# Patient Record
Sex: MALE | Race: WHITE | NOT HISPANIC OR LATINO | Employment: OTHER | URBAN - METROPOLITAN AREA
[De-identification: names, ages, dates, MRNs, and addresses within clinical notes are randomized per-mention and may not be internally consistent; named-entity substitution may affect disease eponyms.]

---

## 2023-01-06 PROBLEM — I70.222 CRITICAL LIMB ISCHEMIA OF LEFT LOWER EXTREMITY: Status: ACTIVE | Noted: 2023-01-06

## 2024-01-18 PROBLEM — I73.9 PERIPHERAL VASCULAR DISEASE: Status: ACTIVE | Noted: 2024-01-18

## 2024-02-21 ENCOUNTER — OFFICE VISIT (OUTPATIENT)
Dept: UROLOGY | Facility: CLINIC | Age: 49
End: 2024-02-21
Payer: COMMERCIAL

## 2024-02-21 VITALS
HEART RATE: 91 BPM | SYSTOLIC BLOOD PRESSURE: 112 MMHG | DIASTOLIC BLOOD PRESSURE: 74 MMHG | RESPIRATION RATE: 16 BRPM | WEIGHT: 270.06 LBS | BODY MASS INDEX: 39.88 KG/M2

## 2024-02-21 DIAGNOSIS — N50.811 PAIN IN BOTH TESTICLES: Primary | ICD-10-CM

## 2024-02-21 DIAGNOSIS — N50.812 PAIN IN BOTH TESTICLES: Primary | ICD-10-CM

## 2024-02-21 PROCEDURE — 99204 OFFICE O/P NEW MOD 45 MIN: CPT | Mod: S$GLB,,, | Performed by: UROLOGY

## 2024-02-21 PROCEDURE — 3078F DIAST BP <80 MM HG: CPT | Mod: CPTII,S$GLB,, | Performed by: UROLOGY

## 2024-02-21 PROCEDURE — 99999 PR PBB SHADOW E&M-EST. PATIENT-LVL IV: CPT | Mod: PBBFAC,,, | Performed by: UROLOGY

## 2024-02-21 PROCEDURE — 1160F RVW MEDS BY RX/DR IN RCRD: CPT | Mod: CPTII,S$GLB,, | Performed by: UROLOGY

## 2024-02-21 PROCEDURE — 1159F MED LIST DOCD IN RCRD: CPT | Mod: CPTII,S$GLB,, | Performed by: UROLOGY

## 2024-02-21 PROCEDURE — 3074F SYST BP LT 130 MM HG: CPT | Mod: CPTII,S$GLB,, | Performed by: UROLOGY

## 2024-02-21 PROCEDURE — 3008F BODY MASS INDEX DOCD: CPT | Mod: CPTII,S$GLB,, | Performed by: UROLOGY

## 2024-02-21 RX ORDER — CEFDINIR 300 MG/1
300 CAPSULE ORAL 2 TIMES DAILY
Qty: 42 CAPSULE | Refills: 0 | Status: SHIPPED | OUTPATIENT
Start: 2024-02-21 | End: 2024-03-13

## 2024-02-21 NOTE — PROGRESS NOTES
Chief Complaint:   Encounter Diagnosis   Name Primary?    Pain in both testicles Yes       HPI:  48-year-old gentleman who comes in with bilateral testicular discomfort.  Patient states this been going on now for a couple of years, he is seen previous outside urologist who actually caused him to improve for a while with 3 weeks of antibiotics.  Unfortunately when he is placed on Coumadin he can not restart these antibiotics, this has been intermittent and recurrence since then.  At times it lasts for a few days, no real eliciting or inciting factors.  On a pain scale of 10 this max has out at around 7 at its worse.  The longest he went without having symptoms was about a year after the antibiotics.  Patient has significant peripheral vascular disease with some form of lymphoproliferative disorder.  No significant lower urinary tract symptoms, at that time they diagnosed him as possible prostatitis but again no voiding complaints just testicular discomfort.  Patient does not get up at all per night to void, no lower urinary tract symptoms, no gross hematuria, no history of smoking, no dysuria, no other urological history, no family history of urological cancers or stones.      Allergies:  Patient has no known allergies.    Medications:  See MAR    Review of Systems:  General: No fever, chills, fatigability, or weight loss.  Skin: No rashes, itching, or changes in color or texture of skin.  Chest: Denies MAYO, cyanosis, wheezing, cough, and sputum production.  Abdomen: Appetite fine. No weight loss. Denies diarrhea, abdominal pain, hematemesis, or blood in stool.  Musculoskeletal: No joint stiffness or swelling. Denies back pain.  : As above.  All other review of systems negative.    PMH:   has a past medical history of Acid reflux, COVID-19 (2021), COVID-19 vaccine series completed, Deep vein blood clot of left lower extremity, Elevated liver enzymes, Gout, unspecified, H/O cluster headache, Ingrown left big toenail  (01/05/2023), Numbness, Pneumonia, Polycythemia vera, and Staph infection.    PSH:   has a past surgical history that includes Transesophageal echocardiography; Fasciotomy (Left, 11/2022); angiogram, extremity, unilateral (Left, 11/2022); angiogram, aortic arch, peripheral (Left, 01/06/2023); Angioplasty; and Angiography of lower extremity (Left, 1/18/2024).    FamHx: family history includes Heart disease in his father; Hypertension in his mother.    SocHx:  reports that he has never smoked. He has never used smokeless tobacco. He reports current alcohol use. He reports that he does not use drugs.      Physical Exam:  Vitals:    02/21/24 1447   BP: 112/74   Pulse: 91   Resp: 16     General: A&Ox3, no apparent distress, no deformities  Neck: No masses, normal ROM  Lungs: normal inspiration, no use of accessory muscles  Heart: normal pulse, no arrhythmias  Abdomen: Soft, NT, ND, no masses, no hernias, no hepatosplenomegaly  Skin: The skin is warm and dry. No jaundice.  Ext: No c/c/e.  : 4/24- Test desc bonita, 0.5 cm right testicular lesion, no abnormalities of epididymus. Normal penile and scrotal skin. Meatus normal.    Labs/Studies:   UA normal 2/24    Impression/Plan:     Bilateral testicular pain- previous improvement with antibiotics, he has not had any in some time though for treatment of his testicles.  Obtain scrotal ultrasound especially in light of the fact that there was this small little lesion on the right testicle.  Treat with cefdinir b.i.d. times 21 days and re-evaluate in 6 weeks to reassess symptoms.  If failure further therapeutic and diagnostic options will be made available.  Call with any complaints in the meantime.

## 2024-03-04 ENCOUNTER — HOSPITAL ENCOUNTER (OUTPATIENT)
Dept: RADIOLOGY | Facility: HOSPITAL | Age: 49
Discharge: HOME | End: 2024-03-04
Attending: UROLOGY
Payer: COMMERCIAL

## 2024-03-04 DIAGNOSIS — N50.812 PAIN IN BOTH TESTICLES: ICD-10-CM

## 2024-03-04 DIAGNOSIS — N50.811 PAIN IN BOTH TESTICLES: ICD-10-CM

## 2024-03-04 PROCEDURE — 76870 US EXAM SCROTUM: CPT | Mod: TC

## 2024-03-04 PROCEDURE — 76870 US EXAM SCROTUM: CPT | Mod: 26,,, | Performed by: RADIOLOGY
